# Patient Record
(demographics unavailable — no encounter records)

---

## 2024-10-11 NOTE — HISTORY OF PRESENT ILLNESS
[FreeTextEntry1] : SHELLY ASHTON 30 YO, presents for Annual & STI Testing G 0 LMP: 07/21/24 - Irregular menses Sexually active, using condoms on & off Medication: Iron No family history of breast cancer. To do monthly SBE.  Patient with PCOS - Was on OCP's on & off.  No change in weight for 1 year.  No complaints as such, but likely to be tested for STI.  HIV Counseling  done.  To f/u for endometrial Biopsy. & discussion.

## 2024-10-11 NOTE — PHYSICAL EXAM
[Chaperone Present] : A chaperone was present in the examining room during all aspects of the physical examination [[id number]] : A chaperone was present during the pelvic exam. [Soft] : soft [Non-tender] : non-tender [Examination Of The Breasts] : a normal appearance [No Masses] : no breast masses were palpable [Labia Majora] : normal [Labia Minora] : normal [Normal] : normal [Uterine Adnexae] : normal

## 2024-10-17 NOTE — HISTORY OF PRESENT ILLNESS
[FreeTextEntry1] : SHELLY ASHTON 28 YO, presents for Biopsy & Results.  LMP: 07/21/24 - Irregular menses Sexually active, using condoms on & off Medication: Iron. PAP- WNL, VAGINOSIS PANEL,NG & CT- Not detected.  Blood results- Reviewed.  HSV-1- IGG- POSITIVE. HBA1C- 4.9 HSV-2, HIV & RPR- Negative.  Rest of the blood - WNR. HCG- NEGATIVE Endometrial biopsy procedure discussed.  R/B/A informed, all questions answered. For Pelvic sonogram with Elyssa- to call for results.

## 2024-10-17 NOTE — PROCEDURE
[Endometrial Biopsy] : Endometrial biopsy [Time out performed] : Pre-procedure time out performed.  Patient's name, date of birth and procedure confirmed. [Consent Obtained] : Consent obtained [Irregular Bleeding] : irregular uterine bleeding [Risks] : risks [Benefits] : benefits [Alternatives] : alternatives [Patient] : patient [Infection] : infection [Bleeding] : bleeding [Allergic Reaction] : allergic reaction [Uterine Perforation] : uterine perforation [Pain] : pain [Negative] : negative pregnancy test [Betadine] : Betadine [None] : none [Tenaculum] : Tenaculum [Required Dilation] : required dilation [Anteverted] : anteverted [Scant] : scant [Specimen Collected] : collected [Sent to Pathology] : placed in buffered formalin and sent for pathology [ECC] : Endocervical curettage was also performed [Tolerated Well] : Patient tolerated the procedure well [No Complications] : No complications [de-identified] : Speculum placed